# Patient Record
Sex: FEMALE | Race: WHITE
[De-identification: names, ages, dates, MRNs, and addresses within clinical notes are randomized per-mention and may not be internally consistent; named-entity substitution may affect disease eponyms.]

---

## 2018-09-19 ENCOUNTER — HOSPITAL ENCOUNTER (OUTPATIENT)
Dept: HOSPITAL 92 - SCSMRI | Age: 23
Discharge: HOME | End: 2018-09-19
Attending: FAMILY MEDICINE
Payer: COMMERCIAL

## 2018-09-19 DIAGNOSIS — R42: ICD-10-CM

## 2018-09-19 DIAGNOSIS — R51: Primary | ICD-10-CM

## 2018-09-19 DIAGNOSIS — R53.83: ICD-10-CM

## 2018-09-19 DIAGNOSIS — R43.9: ICD-10-CM

## 2018-09-19 PROCEDURE — 70553 MRI BRAIN STEM W/O & W/DYE: CPT

## 2018-09-19 PROCEDURE — A9579 GAD-BASE MR CONTRAST NOS,1ML: HCPCS

## 2018-09-19 NOTE — MRI
MRI BRAIN WITH AND WITHOUT CONTRAST:

 

Date:  09/19/18 

 

HISTORY:  

Strange smell, as if there is something burning or a chemical smell x2 days. 

 

COMPARISON:  

None. 

 

TECHNIQUE:  

Brain MRI is performed with and without intravenous Gadolinium administration. Multisequential, multi
planar imaging is performed. 

 

FINDINGS:

No hemorrhage on the axial gradient echo sequence. 

 

No parenchymal mass, mass effect, or midline shift. Brain volume, age-appropriate. Cortical gray-whit
e matter differentiation is preserved. 

 

Ventricles and sulci are patent and symmetric. 

 

Central arterial flow-voids are maintained. Absent restricted diffusion. 

 

Calvarium has a normal T1 marrow signal intensity. Midline brain parenchymal structures are unremarka
ble. 

 

Note is made of a partially empty sella, which is atypical for a patient of this age. 

 

Adequate aeration of the sinuses and mastoid air cells. No significant T2 or FLAIR white matter hyper
intensities. 

 

No pathologic enhancement of the brain parenchyma. 

 

Coronal images do not demonstrate any abnormal signal intensity associated with the left or right olf
actory bulb. 

 

 

IMPRESSION: 

 

1.  No pathologic enhancement of the brain parenchyma. 

 

2.  Partially empty sella, atypical for a patient of this age. Correlate for intracranial hypertensio
n/pseudotumor. 

 

 

 

POS: [facility]

## 2019-06-17 ENCOUNTER — HOSPITAL ENCOUNTER (OUTPATIENT)
Dept: HOSPITAL 92 - SCSMRI | Age: 24
Discharge: HOME | End: 2019-06-17
Attending: FAMILY MEDICINE
Payer: COMMERCIAL

## 2019-06-17 DIAGNOSIS — M51.27: ICD-10-CM

## 2019-06-17 DIAGNOSIS — R20.0: ICD-10-CM

## 2019-06-17 DIAGNOSIS — M54.9: Primary | ICD-10-CM

## 2019-06-17 PROCEDURE — 72148 MRI LUMBAR SPINE W/O DYE: CPT

## 2019-06-17 NOTE — MRI
MRI LUMBAR SPINE WITHOUT CONTRAST

6/17/19

 

HISTORY: 

Back pain with radiation to the right lower extremity with numbness in toes.

 

FINDINGS: 

The vertebral artery heights and bone marrow signal are maintained. Conus medullaris ends at L1 level
. 

 

There is a small focal central disc protrusion at L3-4 level and at L4-5 level causing impingement on
 the anterior thecal sac. 

 

There is a large central and right paracentral focal disc protrusion at L5-S1 level with inferior ext
rusion. This results in impingement of the anterior thecal sac and the nerve roots in the lateral rec
esses, right greater than left. There is severe right lateral recess stenosis at L5-S1 level.

 

No neural foraminal stenosis is seen. The paraspinal musculature is normal. 

 

IMPRESSION: 

Large disc protrusion with extrusion at L5-S1 level. 

 

POS: [facility]